# Patient Record
Sex: FEMALE | Race: WHITE | NOT HISPANIC OR LATINO | Employment: FULL TIME | URBAN - NONMETROPOLITAN AREA
[De-identification: names, ages, dates, MRNs, and addresses within clinical notes are randomized per-mention and may not be internally consistent; named-entity substitution may affect disease eponyms.]

---

## 2017-07-27 ENCOUNTER — APPOINTMENT (OUTPATIENT)
Dept: OCCUPATIONAL MEDICINE | Facility: OTHER | Age: 32
End: 2017-07-27

## 2017-08-01 ENCOUNTER — APPOINTMENT (OUTPATIENT)
Dept: OCCUPATIONAL MEDICINE | Facility: OTHER | Age: 32
End: 2017-08-01

## 2017-10-31 ENCOUNTER — APPOINTMENT (OUTPATIENT)
Dept: CT IMAGING | Facility: HOSPITAL | Age: 32
End: 2017-10-31
Attending: INTERNAL MEDICINE
Payer: COMMERCIAL

## 2017-10-31 ENCOUNTER — APPOINTMENT (OUTPATIENT)
Dept: GENERAL RADIOLOGY | Facility: HOSPITAL | Age: 32
End: 2017-10-31
Attending: INTERNAL MEDICINE
Payer: COMMERCIAL

## 2017-10-31 ENCOUNTER — HOSPITAL ENCOUNTER (EMERGENCY)
Facility: HOSPITAL | Age: 32
Discharge: HOME | End: 2017-10-31
Attending: INTERNAL MEDICINE | Admitting: INTERNAL MEDICINE
Payer: COMMERCIAL

## 2017-10-31 VITALS
SYSTOLIC BLOOD PRESSURE: 123 MMHG | DIASTOLIC BLOOD PRESSURE: 80 MMHG | TEMPERATURE: 98.6 F | HEART RATE: 107 BPM | OXYGEN SATURATION: 100 % | RESPIRATION RATE: 18 BRPM

## 2017-10-31 DIAGNOSIS — R55 VASOVAGAL SYNCOPE: ICD-10-CM

## 2017-10-31 LAB
ALBUMIN SERPL-MCNC: 4.1 G/DL (ref 3.4–5)
ALBUMIN UR-MCNC: 30 MG/DL
ALP SERPL-CCNC: 58 U/L (ref 40–150)
ALT SERPL W P-5'-P-CCNC: 31 U/L (ref 0–50)
ANION GAP SERPL CALCULATED.3IONS-SCNC: 6 MMOL/L (ref 3–14)
APPEARANCE UR: ABNORMAL
AST SERPL W P-5'-P-CCNC: 15 U/L (ref 0–45)
BACTERIA #/AREA URNS HPF: ABNORMAL /HPF
BASOPHILS # BLD AUTO: 0 10E9/L (ref 0–0.2)
BASOPHILS NFR BLD AUTO: 0.4 %
BILIRUB SERPL-MCNC: 0.6 MG/DL (ref 0.2–1.3)
BILIRUB UR QL STRIP: NEGATIVE
BUN SERPL-MCNC: 9 MG/DL (ref 7–30)
CALCIUM SERPL-MCNC: 8.6 MG/DL (ref 8.5–10.1)
CHLORIDE SERPL-SCNC: 107 MMOL/L (ref 94–109)
CK SERPL-CCNC: 32 U/L (ref 30–225)
CO2 SERPL-SCNC: 25 MMOL/L (ref 20–32)
COLOR UR AUTO: YELLOW
CREAT SERPL-MCNC: 0.74 MG/DL (ref 0.52–1.04)
CRP SERPL-MCNC: <2.9 MG/L (ref 0–8)
D DIMER PPP DDU-MCNC: <200 NG/ML D-DU (ref 0–300)
DIFFERENTIAL METHOD BLD: ABNORMAL
EOSINOPHIL # BLD AUTO: 0 10E9/L (ref 0–0.7)
EOSINOPHIL NFR BLD AUTO: 0.3 %
ERYTHROCYTE [DISTWIDTH] IN BLOOD BY AUTOMATED COUNT: 13.2 % (ref 10–15)
GFR SERPL CREATININE-BSD FRML MDRD: >90 ML/MIN/1.7M2
GLUCOSE SERPL-MCNC: 92 MG/DL (ref 70–99)
GLUCOSE UR STRIP-MCNC: NEGATIVE MG/DL
HCG UR QL: NEGATIVE
HCT VFR BLD AUTO: 42.2 % (ref 35–47)
HGB BLD-MCNC: 14.1 G/DL (ref 11.7–15.7)
HGB UR QL STRIP: NEGATIVE
IMM GRANULOCYTES # BLD: 0 10E9/L (ref 0–0.4)
IMM GRANULOCYTES NFR BLD: 0.3 %
KETONES UR STRIP-MCNC: NEGATIVE MG/DL
LACTATE SERPL-SCNC: 0.8 MMOL/L (ref 0.4–2)
LEUKOCYTE ESTERASE UR QL STRIP: ABNORMAL
LYMPHOCYTES # BLD AUTO: 1.4 10E9/L (ref 0.8–5.3)
LYMPHOCYTES NFR BLD AUTO: 12 %
MCH RBC QN AUTO: 28.1 PG (ref 26.5–33)
MCHC RBC AUTO-ENTMCNC: 33.4 G/DL (ref 31.5–36.5)
MCV RBC AUTO: 84 FL (ref 78–100)
MONOCYTES # BLD AUTO: 0.6 10E9/L (ref 0–1.3)
MONOCYTES NFR BLD AUTO: 4.9 %
MUCOUS THREADS #/AREA URNS LPF: PRESENT /LPF
NEUTROPHILS # BLD AUTO: 9.4 10E9/L (ref 1.6–8.3)
NEUTROPHILS NFR BLD AUTO: 82.1 %
NITRATE UR QL: NEGATIVE
NRBC # BLD AUTO: 0 10*3/UL
NRBC BLD AUTO-RTO: 0 /100
PH UR STRIP: 6 PH (ref 4.7–8)
PLATELET # BLD AUTO: 249 10E9/L (ref 150–450)
POTASSIUM SERPL-SCNC: 3.6 MMOL/L (ref 3.4–5.3)
PROT SERPL-MCNC: 8 G/DL (ref 6.8–8.8)
RBC # BLD AUTO: 5.02 10E12/L (ref 3.8–5.2)
RBC #/AREA URNS AUTO: 2 /HPF (ref 0–2)
SODIUM SERPL-SCNC: 138 MMOL/L (ref 133–144)
SOURCE: ABNORMAL
SP GR UR STRIP: 1.02 (ref 1–1.03)
SQUAMOUS #/AREA URNS AUTO: 7 /HPF (ref 0–1)
TROPONIN I SERPL-MCNC: <0.015 UG/L (ref 0–0.04)
UROBILINOGEN UR STRIP-MCNC: 2 MG/DL (ref 0–2)
WBC # BLD AUTO: 11.4 10E9/L (ref 4–11)
WBC #/AREA URNS AUTO: 11 /HPF (ref 0–2)

## 2017-10-31 PROCEDURE — 93010 ELECTROCARDIOGRAM REPORT: CPT | Performed by: INTERNAL MEDICINE

## 2017-10-31 PROCEDURE — 99285 EMERGENCY DEPT VISIT HI MDM: CPT | Performed by: FAMILY MEDICINE

## 2017-10-31 PROCEDURE — 36415 COLL VENOUS BLD VENIPUNCTURE: CPT | Performed by: INTERNAL MEDICINE

## 2017-10-31 PROCEDURE — 87086 URINE CULTURE/COLONY COUNT: CPT | Performed by: INTERNAL MEDICINE

## 2017-10-31 PROCEDURE — 84484 ASSAY OF TROPONIN QUANT: CPT | Performed by: INTERNAL MEDICINE

## 2017-10-31 PROCEDURE — 81001 URINALYSIS AUTO W/SCOPE: CPT | Performed by: INTERNAL MEDICINE

## 2017-10-31 PROCEDURE — 96360 HYDRATION IV INFUSION INIT: CPT

## 2017-10-31 PROCEDURE — 85379 FIBRIN DEGRADATION QUANT: CPT | Performed by: INTERNAL MEDICINE

## 2017-10-31 PROCEDURE — 25000128 H RX IP 250 OP 636: Performed by: INTERNAL MEDICINE

## 2017-10-31 PROCEDURE — 93005 ELECTROCARDIOGRAM TRACING: CPT

## 2017-10-31 PROCEDURE — 82550 ASSAY OF CK (CPK): CPT | Performed by: INTERNAL MEDICINE

## 2017-10-31 PROCEDURE — 99285 EMERGENCY DEPT VISIT HI MDM: CPT | Mod: 25

## 2017-10-31 PROCEDURE — 85025 COMPLETE CBC W/AUTO DIFF WBC: CPT | Performed by: INTERNAL MEDICINE

## 2017-10-31 PROCEDURE — 83605 ASSAY OF LACTIC ACID: CPT | Performed by: INTERNAL MEDICINE

## 2017-10-31 PROCEDURE — 71020 XR CHEST 2 VW: CPT | Mod: TC

## 2017-10-31 PROCEDURE — 81025 URINE PREGNANCY TEST: CPT | Performed by: INTERNAL MEDICINE

## 2017-10-31 PROCEDURE — 86140 C-REACTIVE PROTEIN: CPT | Performed by: INTERNAL MEDICINE

## 2017-10-31 PROCEDURE — 80053 COMPREHEN METABOLIC PANEL: CPT | Performed by: INTERNAL MEDICINE

## 2017-10-31 PROCEDURE — 70450 CT HEAD/BRAIN W/O DYE: CPT | Mod: TC

## 2017-10-31 RX ORDER — MULTIPLE VITAMINS W/ MINERALS TAB 9MG-400MCG
1 TAB ORAL DAILY
COMMUNITY

## 2017-10-31 RX ADMIN — SODIUM CHLORIDE 1000 ML: 9 INJECTION, SOLUTION INTRAVENOUS at 07:42

## 2017-10-31 ASSESSMENT — ENCOUNTER SYMPTOMS
COLOR CHANGE: 0
BACK PAIN: 0
NUMBNESS: 0
CHEST TIGHTNESS: 0
WOUND: 0
ABDOMINAL PAIN: 0
WHEEZING: 0
HEMATURIA: 0
WEAKNESS: 0
HEADACHES: 0
FLANK PAIN: 0
DIAPHORESIS: 0
DYSURIA: 0
DIZZINESS: 1
PALPITATIONS: 0
MYALGIAS: 0
FEVER: 0
SEIZURES: 0
SHORTNESS OF BREATH: 0
CHILLS: 0
COUGH: 0
ANAL BLEEDING: 0
NECK PAIN: 0
SYNCOPE: 1
NECK STIFFNESS: 0
ABDOMINAL DISTENTION: 0
BLOOD IN STOOL: 0
NAUSEA: 0
CONFUSION: 0
VOMITING: 0
SPEECH DIFFICULTY: 0
VOICE CHANGE: 0
LIGHT-HEADEDNESS: 1
ARTHRALGIAS: 0

## 2017-10-31 NOTE — ED NOTES
Arrived to triage with  at side with c/o passing out while having a bowel movement on the toilet at 0615 this morning.  states patient was unconscious for about 30 seconds. Denies hitting head. Denies any pain. States she has passed out like this before. Denies taking any medications other than multi-vitamin. Denies any dizziness or issues now but does c/o slight nausea.

## 2017-10-31 NOTE — ED AVS SNAPSHOT
[facility]    [address] [address]    Phone:  [phone]                                       Bing Osullivan   MRN: [id number]    Department:  [facility]   Date of Visit:  10/31/2017           Patient Information     Date Of Birth          1985        Your diagnoses for this visit were:     Vasovagal syncope        You were seen by Morgan Rushing MD.      Follow-up Information     Follow up with Anthony Drake DO In 1 week.    Specialty:  Family Practice    Why:  Follow up ED visit    Contact information:    [facility]  [address] [facility] [address]  [phone]        Discharge References/Attachments     VASOVAGAL SYNCOPE, UNDERSTANDING (ENGLISH)         Review of your medicines      Our records show that you are taking the medicines listed below. If these are incorrect, please call your family doctor or clinic.        Dose / Directions Last dose taken    Multi-vitamin Tabs tablet   Dose:  1 tablet        Take 1 tablet by mouth daily   Refills:  0                Procedures and tests performed during your visit     CBC with platelets differential    CK total    CRP inflammation    CT Head w/o Contrast    Cardiac Continuous Monitoring    Comprehensive metabolic panel    D-Dimer ([facility],)    EKG 12-lead, tracing only    HCG qualitative urine    Lactic acid    Orthostatic blood pressure and pulse    Troponin I    UA with Microscopic reflex to Culture    Urine Culture Aerobic Bacterial    XR Chest 2 Views      Orders Needing Specimen Collection     None      Pending Results     Date and Time Order Name Status Description    10/31/2017 0838 Urine Culture Aerobic Bacterial In process             Pending Culture Results     Date and Time Order Name Status Description    10/31/2017 0838 Urine Culture Aerobic Bacterial In process             Thank you for choosing [facility]       Thank you for choosing [facility] for your care. Our goal is always  to provide you with excellent care. Hearing back from our patients is one way we can continue to improve our services. Please take a few minutes to complete the written survey that you may receive in the mail after you visit with us. Thank you!        [employer] Information     [employer] gives you secure access to your electronic health record. If you see a primary care provider, you can also send messages to your care team and make appointments. If you have questions, please call your primary care clinic.  If you do not have a primary care provider, please call [phone] and they will assist you.        Care EveryWhere ID     This is your Care EveryWhere ID. This could be used by other organizations to access your [place] medical records  [id number]        Equal Access to Services     DUNCAN WILKERSON : Thania Darnell, harini anguiano, robbi garcia, barbara campbell. So [facility] [phone].    ATENCIÓN: Si habla español, tiene a gaffney disposición servicios gratuitos de asistencia lingüística. Llame al [phone].    We comply with applicable federal civil rights laws and [place] laws. We do not discriminate on the basis of race, color, national origin, age, disability, sex, sexual orientation, or gender identity.            After Visit Summary       This is your record. Keep this with you and show to your community pharmacist(s) and doctor(s) at your next visit.

## 2017-10-31 NOTE — ED PROVIDER NOTES
History     Chief Complaint   Patient presents with     Loss of Consciousness     states was having a bowel movement on the toilet at 0615 when she passed out.     Patient is a 32 year old female presenting with syncope. The history is provided by the patient.   Syncope   Episode history:  Single  Most recent episode:  Today  Duration:  20 seconds  Timing:  Sporadic  Progression:  Resolved  Chronicity:  Recurrent  Context: bowel movement    Associated symptoms: dizziness    Associated symptoms: no chest pain, no confusion, no diaphoresis, no fever, no headaches, no nausea, no palpitations, no seizures, no shortness of breath, no vomiting and no weakness          Problem List:    Patient Active Problem List    Diagnosis Date Noted     Preeclampsia complicating hypertension 2016     Priority: Medium     Post partum          delivery delivered 2016     Priority: Medium     Twins.  Breech  1 LTCS with BTO  Jaiden 16       Encounter for sterilization 2016     Priority: Medium     Desires BTO if CS.  Counseled.  Preferred one insurance.  No gov't pay.         HTN (hypertension) 01/15/2015     Priority: Medium     Palpitations 2014     Priority: Medium     Work up benign 4 years ago with echo/ekg by report.  Was placed on B-Blocker.  Will fu with Dr. Rodrigues.          Past Medical History:    Past Medical History:   Diagnosis Date     Seasonal allergies        Past Surgical History:    Past Surgical History:   Procedure Laterality Date     COMBINED  SECTION, TUBAL OCCLUSION Bilateral 2016    Procedure: COMBINED  SECTION, TUBAL OCCLUSION;  Surgeon: Elder Hwang MD;  Location: [facility]     wisdom teeth removed         Family History:    Family History   Problem Relation Age of Onset     Hypertension Mother      Lipids Father      Hyperlipidemia Father      DIABETES No family hx of      Asthma No family hx of      Thyroid Disease No family hx of      Colon Cancer No  family hx of      Breast Cancer No family hx of      MYH-Associated Polyposis Maternal Uncle        Social History:  Marital Status:   [2]  Social History   Substance Use Topics     Smoking status: Never Smoker     Smokeless tobacco: Never Used     Alcohol use No      Comment: Occasionally         Medications:      multivitamin, therapeutic with minerals (MULTI-VITAMIN) TABS tablet         Review of Systems   Constitutional: Negative for chills, diaphoresis and fever.   HENT: Negative for voice change.    Eyes: Negative for visual disturbance.   Respiratory: Negative for cough, chest tightness, shortness of breath and wheezing.    Cardiovascular: Positive for syncope. Negative for chest pain, palpitations and leg swelling.   Gastrointestinal: Negative for abdominal distention, abdominal pain, anal bleeding, blood in stool, nausea and vomiting.   Genitourinary: Negative for decreased urine volume, dysuria, flank pain and hematuria.   Musculoskeletal: Negative for arthralgias, back pain, gait problem, myalgias, neck pain and neck stiffness.   Skin: Negative for color change, pallor, rash and wound.   Neurological: Positive for dizziness, syncope and light-headedness. Negative for seizures, speech difficulty, weakness, numbness and headaches.   Psychiatric/Behavioral: Negative for confusion and suicidal ideas.       Physical Exam   BP: 117/92  Pulse: 107  Heart Rate: 95  Temp: 97  F (36.1  C)  Resp: 17  SpO2: 100 %  Lying Orthostatic BP: 115/74  Lying Orthostatic Pulse: 97 bpm  Sitting Orthostatic BP: 117/89  Sitting Orthostatic Pulse: 104 bpm  Standing Orthostatic BP: 112/82  Standing Orthostatic Pulse: 115 bpm      Physical Exam   Constitutional: She is oriented to person, place, and time. She appears well-developed and well-nourished.   HENT:   Head: Normocephalic and atraumatic.   Mouth/Throat: No oropharyngeal exudate.   Eyes: Conjunctivae are normal. Pupils are equal, round, and reactive to light.   Neck:  Normal range of motion. Neck supple. No JVD present. No tracheal deviation present. No thyromegaly present.   Cardiovascular: Normal rate, regular rhythm, normal heart sounds and intact distal pulses.  Exam reveals no gallop and no friction rub.    No murmur heard.  Pulmonary/Chest: Effort normal and breath sounds normal. No stridor. No respiratory distress. She has no wheezes. She has no rales. She exhibits no tenderness.   Abdominal: Soft. Bowel sounds are normal. She exhibits no distension and no mass. There is no tenderness. There is no rebound and no guarding.   Musculoskeletal: Normal range of motion. She exhibits no edema or tenderness.   Lymphadenopathy:     She has no cervical adenopathy.   Neurological: She is alert and oriented to person, place, and time.   Skin: Skin is warm and dry. No rash noted. No erythema. No pallor.   Psychiatric: Her behavior is normal.   Nursing note and vitals reviewed.      ED Course     ED Course     Procedures    Labs Ordered and Resulted from Time of ED Arrival Up to the Time of Departure from the ED   CBC WITH PLATELETS DIFFERENTIAL - Abnormal; Notable for the following:        Result Value    WBC 11.4 (*)     Absolute Neutrophil 9.4 (*)     All other components within normal limits   ROUTINE UA WITH MICROSCOPIC REFLEX TO CULTURE - Abnormal; Notable for the following:     Protein Albumin Urine 30 (*)     Leukocyte Esterase Urine Moderate (*)     WBC Urine 11 (*)     Bacteria Urine Few (*)     Squamous Epithelial /HPF Urine 7 (*)     Mucous Urine Present (*)     All other components within normal limits   LACTIC ACID   CRP INFLAMMATION   TROPONIN I   COMPREHENSIVE METABOLIC PANEL   CK TOTAL   D-DIMER ([facility])   HCG QUALITATIVE URINE   CARDIAC CONTINUOUS MONITORING   ORTHOSTATIC BLOOD PRESSURE AND PULSE   URINE CULTURE AEROBIC BACTERIAL       Assessments & Plan (with Medical Decision Making)   Had 1 episode of watery diarrhea, while in bathroom lost consciousness for less than  30 sec, witnessed by . Before this she felt lightheaded and dizzy.     Symptoms resolved completely , has had hx of similar episode in the past.     Labs and imaging studies ordered, s/o to Dr Magallanes at the change of shift.     CT head is normal as are labs.  Patient will be discharged with follow up with PCP in 1 week.    I have reviewed the nursing notes.    I have reviewed the findings, diagnosis, plan and need for follow up with the patient.    New Prescriptions    No medications on file       Final diagnoses:   Vasovagal syncope       10/31/2017   [facility]     Odalys Powell MD  10/31/17 0905

## 2017-10-31 NOTE — ED AVS SNAPSHOT
[facility]    [address] [address]    Phone:  [phone]                                       Bing Osullivan   MRN: [id number]    Department:  [facility]   Date of Visit:  10/31/2017           After Visit Summary Signature Page     I have received my discharge instructions, and my questions have been answered. I have discussed any challenges I see with this plan with the nurse or doctor.    ..........................................................................................................................................  Patient/Patient Representative Signature      ..........................................................................................................................................  Patient Representative Print Name and Relationship to Patient    ..................................................               ................................................  Date                                            Time    ..........................................................................................................................................  Reviewed by Signature/Title    ...................................................              ..............................................  Date                                                            Time

## 2017-10-31 NOTE — ED NOTES
Patient discharged home at this time. Patient given written and verbal discharge instructions regarding home care, f/u and medications. Patient verbalized understanding of all discharge instructions. Given work release notice

## 2017-10-31 NOTE — LETTER
[facility]  [address]  [place] [address]  Phone: [phone]    October 31, 2017        Bing Osullivan  [address]  [place] [address]          To whom it may concern:    RE: Bing Osullivan    Bing Osullivan was seen in the emergency room and should not return to work today.    Please contact me for questions or concerns.      Sincerely,        Odalys Iyer MD

## 2017-11-01 LAB
BACTERIA SPEC CULT: ABNORMAL
SPECIMEN SOURCE: ABNORMAL

## 2017-11-26 ENCOUNTER — HEALTH MAINTENANCE LETTER (OUTPATIENT)
Age: 32
End: 2017-11-26

## 2018-08-13 ENCOUNTER — OFFICE VISIT (OUTPATIENT)
Dept: CHIROPRACTIC MEDICINE | Facility: OTHER | Age: 33
End: 2018-08-13
Attending: CHIROPRACTOR
Payer: COMMERCIAL

## 2018-08-13 DIAGNOSIS — M54.2 CERVICALGIA: ICD-10-CM

## 2018-08-13 DIAGNOSIS — M99.02 SEGMENTAL AND SOMATIC DYSFUNCTION OF THORACIC REGION: ICD-10-CM

## 2018-08-13 DIAGNOSIS — M99.01 SEGMENTAL AND SOMATIC DYSFUNCTION OF CERVICAL REGION: Primary | ICD-10-CM

## 2018-08-13 PROCEDURE — 98940 CHIROPRACT MANJ 1-2 REGIONS: CPT | Mod: AT | Performed by: CHIROPRACTOR

## 2018-08-13 PROCEDURE — 99202 OFFICE O/P NEW SF 15 MIN: CPT | Mod: 25 | Performed by: CHIROPRACTOR

## 2018-08-13 NOTE — MR AVS SNAPSHOT
After Visit Summary   8/13/2018    Bing Osullivan    MRN: [id number]           Patient Information     Date Of Birth          1985        Visit Information        Provider Department      8/13/2018 10:30 AM [facility] J Valdiv[facility]        Today's Diagnoses     Segmental and somatic dysfunction of cervical region    -  1    Cervicalgia        Segmental and somatic dysfunction of thoracic region           Follow-ups after your visit        Your next 10 appointments already scheduled     Aug 16, 2018  8:30 AM CDT   Return Visit with Francisco J Valdivia DC   [facility] Natasha Garcia ([facility])    [address]  [facility] [address]   [phone]              Who to contact     If you have questions or need follow up information about today's clinic visit or your schedule please contact  [facility] NATASHA GARCIA directly at [phone].  Normal or non-critical lab and imaging results will be communicated to you by Tripsideahart, letter or phone within 4 business days after the clinic has received the results. If you do not hear from us within 7 days, please contact the clinic through [employer]ps[employer] or phone. If you have a critical or abnormal lab result, we will notify you by phone as soon as possible.  Submit refill requests through virocyt or call your pharmacy and they will forward the refill request to us. Please allow 3 business days for your refill to be completed.          Additional Information About Your Visit        [employer] Information     [employer] gives you secure access to your electronic health record. If you see a primary care provider, you can also send messages to your care team and make appointments. If you have questions, please call your primary care clinic.  If you do not have a primary care provider, please call [phone] and they will assist you.        Care EveryWhere ID     This is your Care EveryWhere ID. This could be used by other organizations to access your  [place] medical records  [id number]         Blood Pressure from Last 3 Encounters:   10/31/17 123/80   08/02/16 128/74   07/19/16 112/64    Weight from Last 3 Encounters:   08/02/16 171 lb (77.6 kg)   07/19/16 168 lb (76.2 kg)   06/28/16 171 lb (77.6 kg)              We Performed the Following     CHIROPRAC MANIP,SPINAL,1-2 REGIONS        Primary Care Provider Office Phone # Fax #    Anthony Drake  [phone]       [facility] [address]  [facility] [address]        Equal Access to Services     [facility] [place] : Hadii leslie Darnell, waaxda luqadaha, qaybta kaalmada jose, barbara lang . So [facility] [phone].    ATENCIÓN: Si habla español, tiene a gaffney disposición servicios gratuitos de asistencia lingüística. Llame al [phone].    We comply with applicable federal civil rights laws and [place] laws. We do not discriminate on the basis of race, color, national origin, age, disability, sex, sexual orientation, or gender identity.            Thank you!     Thank you for choosing  [facility]  for your care. Our goal is always to provide you with excellent care. Hearing back from our patients is one way we can continue to improve our services. Please take a few minutes to complete the written survey that you may receive in the mail after your visit with us. Thank you!             Your Updated Medication List - Protect others around you: Learn how to safely use, store and throw away your medicines at www.disposemymeds.org.          This list is accurate as of 8/13/18  2:38 PM.  Always use your most recent med list.                   Brand Name Dispense Instructions for use Diagnosis    Multi-vitamin Tabs tablet      Take 1 tablet by mouth daily

## 2018-08-13 NOTE — PROGRESS NOTES
Subjective Finding:    Chief compalint: Patient presents with:  Neck Pain  Back Pain: upper back pan  , Pain Scale: 6/10, Intensity: sharp, Duration: 2 years, Radiating: no.    Date of injury:     Activities that the pain restricts:   Home/household/hobbies/social activities: yes.  Work duties: yes.  Sleep: yes.  Makes symptoms better: rest.  Makes symptoms worse: activity, cervical extension and cervical flexion.  Have you seen anyone else for the symptoms? Yes: MD.  Work related: no.  Automobile related injury: no.    Objective and Assessment:    Posture Analysis:   High shoulder: .  Head tilt: .  High iliac crest: .  Head carriage: forward.  Thoracic Kyphosis: neutral.  Lumbar Lordosis: neutral.    Lumbar Range of Motion: .  Cervical Range of Motion: extension decreased.  Thoracic Range of Motion: extension decreased.  Extremity Range of Motion: .    Palpation:   Lev scapulae: sharp pain, referred pain: no    Segmental dysfunction pre-treatment and treatment area: C2, C5, C7 and T7.    Assessment post-treatment:  Cervical: ROM increased.  Thoracic: ROM increased.  Lumbar: .    Comments: .      Complicating Factors: .    Procedure(s):  CMT:  72761 Chiropractic manipulative treatment 1-2 regions performed   Cervical: Diversified, See above for level, Supine and Thoracic: Diversified, See above for level, Prone    Modalities:  None performed this visit    Therapeutic procedures:  None    Plan:  Treatment plan: PRN.  Instructed patient: stretch as instructed at visit.  Short term goals: increase ROM.  Long term goals: restore normal function.  Prognosis: excellent.

## 2018-08-31 ENCOUNTER — OFFICE VISIT (OUTPATIENT)
Dept: CHIROPRACTIC MEDICINE | Facility: OTHER | Age: 33
End: 2018-08-31
Attending: CHIROPRACTOR
Payer: COMMERCIAL

## 2018-08-31 DIAGNOSIS — M99.01 SEGMENTAL AND SOMATIC DYSFUNCTION OF CERVICAL REGION: Primary | ICD-10-CM

## 2018-08-31 DIAGNOSIS — M99.02 SEGMENTAL AND SOMATIC DYSFUNCTION OF THORACIC REGION: ICD-10-CM

## 2018-08-31 DIAGNOSIS — M54.2 CERVICALGIA: ICD-10-CM

## 2018-08-31 PROCEDURE — 98940 CHIROPRACT MANJ 1-2 REGIONS: CPT | Mod: AT | Performed by: CHIROPRACTOR

## 2018-08-31 NOTE — MR AVS SNAPSHOT
After Visit Summary   8/31/2018    Bing Osullivan    MRN: [id number]           Patient Information     Date Of Birth          1985        Visit Information        Provider Department      8/31/2018 8:30 AM Francicso J [facility]        Today's Diagnoses     Segmental and somatic dysfunction of cervical region    -  1    Cervicalgia        Segmental and somatic dysfunction of thoracic region           Follow-ups after your visit        Who to contact     If you have questions or need follow up information about today's clinic visit or your schedule please contact  [facility] [place] GARCIA directly at [phone].  Normal or non-critical lab and imaging results will be communicated to you by Heliatekhart, letter or phone within 4 business days after the clinic has received the results. If you do not hear from us within 7 days, please contact the clinic through [employer] or phone. If you have a critical or abnormal lab result, we will notify you by phone as soon as possible.  Submit refill requests through [employer] or call your pharmacy and they will forward the refill request to us. Please allow 3 business days for your refill to be completed.          Additional Information About Your Visit        MyChart Information     [employer] gives you secure access to your electronic health record. If you see a primary care provider, you can also send messages to your care team and make appointments. If you have questions, please call your primary care clinic.  If you do not have a primary care provider, please call [phone] and they will assist you.        Care EveryWhere ID     This is your Care EveryWhere ID. This could be used by other organizations to access your [place] medical records  [id number]         Blood Pressure from Last 3 Encounters:   10/31/17 123/80   08/02/16 128/74   07/19/16 112/64    Weight from Last 3 Encounters:   08/02/16 171 lb (77.6 kg)   07/19/16 168 lb (76.2 kg)    06/28/16 171 lb (77.6 kg)              We Performed the Following     CHIROPRAC MANIP,SPINAL,1-2 REGIONS        Primary Care Provider Office Phone # Fax #    Anthony Drake [phone]       [facility] [address]  [facility] [address]        Equal Access to Services     DUNCAN WILKERSON : Hadii aad ku hadasho Soomaali, waaxda luqadaha, qaybta kaalmada adeegyada, waxay idiin hayaan adesindy ruiztonyaemily lang . So [facility] [phone].    ATENCIÓN: Si habla español, tiene a gaffney disposición servicios gratuitos de asistencia lingüística. Tawanda al [phone].    We comply with applicable federal civil rights laws and [place] laws. We do not discriminate on the basis of race, color, national origin, age, disability, sex, sexual orientation, or gender identity.            Thank you!     Thank you for choosing  [facility]  for your care. Our goal is always to provide you with excellent care. Hearing back from our patients is one way we can continue to improve our services. Please take a few minutes to complete the written survey that you may receive in the mail after your visit with us. Thank you!             Your Updated Medication List - Protect others around you: Learn how to safely use, store and throw away your medicines at www.disposemymeds.org.          This list is accurate as of 8/31/18 11:59 PM.  Always use your most recent med list.                   Brand Name Dispense Instructions for use Diagnosis    Multi-vitamin Tabs tablet      Take 1 tablet by mouth daily

## 2018-09-04 NOTE — PROGRESS NOTES
Subjective Finding:    Chief compalint: Patient presents with:  Neck Pain: getting better  Back Pain  , Pain Scale: 6/10, Intensity: sharp, Duration: 2 years, Radiating: no.    Date of injury:     Activities that the pain restricts:   Home/household/hobbies/social activities: yes.  Work duties: yes.  Sleep: yes.  Makes symptoms better: rest.  Makes symptoms worse: activity, cervical extension and cervical flexion.  Have you seen anyone else for the symptoms? Yes: MD.  Work related: no.  Automobile related injury: no.    Objective and Assessment:    Posture Analysis:   High shoulder: .  Head tilt: .  High iliac crest: .  Head carriage: forward.  Thoracic Kyphosis: neutral.  Lumbar Lordosis: neutral.    Lumbar Range of Motion: .  Cervical Range of Motion: extension decreased.  Thoracic Range of Motion: extension decreased.  Extremity Range of Motion: .    Palpation:   Lev scapulae: sharp pain, referred pain: no    Segmental dysfunction pre-treatment and treatment area: C2, C5, C7 and T7.    Assessment post-treatment:  Cervical: ROM increased.  Thoracic: ROM increased.  Lumbar: .    Comments: .      Complicating Factors: .    Procedure(s):  CMT:  88424 Chiropractic manipulative treatment 1-2 regions performed   Cervical: Diversified, See above for level, Supine and Thoracic: Diversified, See above for level, Prone    Modalities:  None performed this visit    Therapeutic procedures:  None    Plan:  Treatment plan: PRN.  Instructed patient: stretch as instructed at visit.  Short term goals: increase ROM.  Long term goals: restore normal function.  Prognosis: excellent.

## 2020-03-02 ENCOUNTER — HEALTH MAINTENANCE LETTER (OUTPATIENT)
Age: 35
End: 2020-03-02

## 2020-10-29 ENCOUNTER — MEDICAL CORRESPONDENCE (OUTPATIENT)
Dept: MRI IMAGING | Facility: HOSPITAL | Age: 35
End: 2020-10-29

## 2020-11-05 ENCOUNTER — HOSPITAL ENCOUNTER (OUTPATIENT)
Dept: MRI IMAGING | Facility: HOSPITAL | Age: 35
Discharge: HOME | End: 2020-11-05
Attending: FAMILY MEDICINE | Admitting: FAMILY MEDICINE
Payer: COMMERCIAL

## 2020-11-05 DIAGNOSIS — M79.89 SOFT TISSUE MASS: ICD-10-CM

## 2020-11-05 DIAGNOSIS — M79.605 PAIN IN LEFT LEG: ICD-10-CM

## 2020-11-05 DIAGNOSIS — M79.605 LEFT LEG PAIN: ICD-10-CM

## 2020-11-05 PROCEDURE — A9585 GADOBUTROL INJECTION: HCPCS | Performed by: RADIOLOGY

## 2020-11-05 PROCEDURE — 73720 MRI LWR EXTREMITY W/O&W/DYE: CPT | Mod: LT

## 2020-11-05 PROCEDURE — 255N000002 HC RX 255 OP 636: Performed by: RADIOLOGY

## 2020-11-05 RX ORDER — GADOBUTROL 604.72 MG/ML
7.5 INJECTION INTRAVENOUS ONCE
Status: COMPLETED | OUTPATIENT
Start: 2020-11-05 | End: 2020-11-05

## 2020-11-05 RX ADMIN — GADOBUTROL 7.5 ML: 604.72 INJECTION INTRAVENOUS at 17:21

## 2020-12-20 ENCOUNTER — HEALTH MAINTENANCE LETTER (OUTPATIENT)
Age: 35
End: 2020-12-20

## 2021-04-18 ENCOUNTER — HEALTH MAINTENANCE LETTER (OUTPATIENT)
Age: 36
End: 2021-04-18

## 2021-06-06 ENCOUNTER — HOSPITAL ENCOUNTER (EMERGENCY)
Facility: HOSPITAL | Age: 36
Discharge: HOME | End: 2021-06-06
Attending: NURSE PRACTITIONER | Admitting: NURSE PRACTITIONER
Payer: COMMERCIAL

## 2021-06-06 ENCOUNTER — APPOINTMENT (OUTPATIENT)
Dept: GENERAL RADIOLOGY | Facility: HOSPITAL | Age: 36
End: 2021-06-06
Attending: NURSE PRACTITIONER
Payer: COMMERCIAL

## 2021-06-06 VITALS
HEART RATE: 93 BPM | SYSTOLIC BLOOD PRESSURE: 153 MMHG | OXYGEN SATURATION: 100 % | RESPIRATION RATE: 14 BRPM | TEMPERATURE: 98.1 F | DIASTOLIC BLOOD PRESSURE: 86 MMHG

## 2021-06-06 DIAGNOSIS — S43.401A SPRAIN OF RIGHT SHOULDER, UNSPECIFIED SHOULDER SPRAIN TYPE, INITIAL ENCOUNTER: ICD-10-CM

## 2021-06-06 PROCEDURE — 73030 X-RAY EXAM OF SHOULDER: CPT | Mod: RT

## 2021-06-06 PROCEDURE — G0463 HOSPITAL OUTPT CLINIC VISIT: HCPCS

## 2021-06-06 PROCEDURE — 99213 OFFICE O/P EST LOW 20 MIN: CPT | Performed by: NURSE PRACTITIONER

## 2021-06-06 RX ORDER — HYDROCHLOROTHIAZIDE 12.5 MG/1
12.5 TABLET ORAL DAILY
COMMUNITY

## 2021-06-06 RX ORDER — LOSARTAN POTASSIUM 100 MG/1
100 TABLET ORAL DAILY
COMMUNITY

## 2021-06-06 ASSESSMENT — ENCOUNTER SYMPTOMS
NUMBNESS: 0
CHILLS: 0
NECK PAIN: 0
COLOR CHANGE: 0
SHORTNESS OF BREATH: 0
ACTIVITY CHANGE: 1
NECK STIFFNESS: 0
NAUSEA: 0
FEVER: 0
VOMITING: 0

## 2021-06-06 NOTE — ED PROVIDER NOTES
History     Chief Complaint   Patient presents with     Shoulder Pain     rt shoulder pain due to injury today     HPI  Bing Osullivan is a 36 year old female who presents with right shoulder pain that occurred today after she fell backwards onto the steps.  She heard a pop.  Had some numbness and tingling in her fingers that has resolved at this time.  Her elbow is nontender.  Denies previous shoulder injuries.  She is right-hand dominant.  Has not taken any over-the-counter medications or tried any home interventions.  Denies any neck pain.  Denies fevers, chills, nausea, vomiting, and shortness of breath.    Musculoskeletal problem/pain      Duration: today    Description  Location: right shoulder right hand dominant    Intensity:  8/10 with movement    Accompanying signs and symptoms: numbness and tingling in fingers has resolved. Elbow is ok    History  Previous similar problem: no   Previous evaluation:  none    Precipitating or alleviating factors:  Trauma or overuse: YES- fell backwards onto stairs  Aggravating factors include: moving    Therapies tried and outcome: nothing     Allergies:  Allergies   Allergen Reactions     Seasonal Allergies Other (See Comments)     Sinus       Dust Mites Other (See Comments)     Red eyes and sneezing       Problem List:    Patient Active Problem List    Diagnosis Date Noted     Preeclampsia complicating hypertension 2016     Priority: Medium     Post partum          delivery delivered 2016     Priority: Medium     Twins.  Breech  1 LTCS with BTO  Hwang 15       Encounter for sterilization 2016     Priority: Medium     Desires BTO if CS.  Counseled.  Preferred one insurance.  No gov't pay.         HTN (hypertension) 01/15/2015     Priority: Medium     Palpitations 2014     Priority: Medium     Work up benign 4 years ago with echo/ekg by report.  Was placed on B-Blocker.  Will fu with Dr. Rodrigues.          Past Medical History:    Past  Medical History:   Diagnosis Date     Seasonal allergies        Past Surgical History:    Past Surgical History:   Procedure Laterality Date     COMBINED  SECTION, TUBAL OCCLUSION Bilateral 2016    Procedure: COMBINED  SECTION, TUBAL OCCLUSION;  Surgeon: Elder Hwang MD;  Location: [facility]     wisdom teeth removed         Family History:    Family History   Problem Relation Age of Onset     Hypertension Mother      Lipids Father      Hyperlipidemia Father      Diabetes No family hx of      Asthma No family hx of      Thyroid Disease No family hx of      Colon Cancer No family hx of      Breast Cancer No family hx of      MYH-Associated Polyposis Maternal Uncle        Social History:  Marital Status:   [2]  Social History     Tobacco Use     Smoking status: Never Smoker     Smokeless tobacco: Never Used   Substance Use Topics     Alcohol use: No     Alcohol/week: 0.0 standard drinks     Comment: Occasionally      Drug use: No        Medications:    hydrochlorothiazide (HYDRODIURIL) 12.5 MG tablet  losartan (COZAAR) 100 MG tablet  multivitamin, therapeutic with minerals (MULTI-VITAMIN) TABS tablet          Review of Systems   Constitutional: Positive for activity change. Negative for chills and fever.   Respiratory: Negative for shortness of breath.    Gastrointestinal: Negative for nausea and vomiting.   Musculoskeletal: Negative for neck pain and neck stiffness.        Right shoulder pain. Hit shoulder on stairs   Skin: Negative for color change.   Neurological: Negative for numbness.       Physical Exam   BP: 153/86  Pulse: 93  Temp: 98.1  F (36.7  C)  Resp: 14  SpO2: 100 %      Physical Exam  Vitals signs and nursing note reviewed.   Constitutional:       General: She is in acute distress (Mild).      Appearance: She is overweight.   Neck:      Musculoskeletal: Normal range of motion and neck supple. No muscular tenderness.   Cardiovascular:      Rate and Rhythm: Normal rate.    Pulmonary:      Effort: Pulmonary effort is normal.   Musculoskeletal:         General: Swelling (Very mild) and tenderness present.      Right shoulder: She exhibits decreased range of motion (Related to pain does not want to move her shoulder at this time), tenderness (Over AC joint) and swelling (Very mild). She exhibits no effusion, no crepitus, no deformity, normal pulse and normal strength (Strong hand grasp, able to move elbow without difficulty).      Comments: Right radial pulse 3+   Skin:     Capillary Refill: Capillary refill takes less than 2 seconds.      Findings: No bruising or erythema.   Neurological:      Mental Status: She is alert and oriented to person, place, and time.   Psychiatric:         Behavior: Behavior normal.         ED Course        Procedures             No results found for this or any previous visit (from the past 24 hour(s)).    Medications - No data to display    Assessments & Plan (with Medical Decision Making)     I have reviewed the nursing notes.    I have reviewed the findings, diagnosis, plan and need for follow up with the patient.  (S48.918A) Sprain of right shoulder, unspecified shoulder sprain type, initial encounter  Comment: 36 year old female who presents with right shoulder pain that occurred today after she fell backwards onto the steps.  She heard a pop.  Had some numbness and tingling in her fingers that has resolved at this time.  Her elbow is nontender.  Denies previous shoulder injuries.  She is right-hand dominant.  Has not taken any over-the-counter medications or tried any home interventions.  Denies any neck pain.  Denies fevers, chills, nausea, vomiting, and shortness of breath.    MDM: NHT. Lungs CTA  Tenderness over the AC joint with palpation.  No ecchymotic or erythema noted, mild swelling. She has her arm in a sling at this time for comfort.  Hand grasp is strong, no pain over the forearm, elbow, or upper arm with palpation.    Right shoulder x-ray  reviewed and per radiology;  Normal right shoulder.    Plan: Education provided for shoulder sprain.  Keep affected extremity elevated as much as possible for next 24 - 48 hours. Ice to affected area 20 minutes every hour as needed for comfort. After 48 hours you can apply heat.  Tylenol 650 to 1000 mg every four to six hours as needed (not to exceed more than 4000 mg in a 24 hour period). May use interchangeably. Suggest medicating around the clock for the next 24-48 hours. Use arm sling  until you can move your arm without having pain. Slowly start to wiggle your fingers and move hand, wrist, and shoulder as often as possible but not beyond the point of pain. Follow up with primary provider as needed  These discharge instructions and medications were reviewed with her and understanding verbalized.    Discharge Medication List as of 6/6/2021  3:19 PM          Final diagnoses:   Sprain of right shoulder, unspecified shoulder sprain type, initial encounter       6/6/2021   [facility]       Therese Marquez, CNP  06/09/21 0930

## 2021-06-06 NOTE — ED TRIAGE NOTES
Pt is here with right shoulder pain after fall donwn stairs and fell onto right shoulder   Pt denies hitting head

## 2021-06-06 NOTE — DISCHARGE INSTRUCTIONS
Keep affected extremity elevated as much as possible for next 24 - 48 hours. Ice to affected area 20 minutes every hour as needed for comfort. After 48 hours you can apply heat.  Tylenol 650 to 1000 mg every four to six hours as needed (not to exceed more than 4000 mg in a 24 hour period). May use interchangeably. Suggest medicating around the clock for the next 24-48 hours. Use arm sling  until you can move your arm without having pain. Slowly start to wiggle your fingers and move hand, wrist, and shoulder as often as possible but not beyond the point of pain. Follow up with primary provider as needed

## 2021-10-03 ENCOUNTER — HEALTH MAINTENANCE LETTER (OUTPATIENT)
Age: 36
End: 2021-10-03

## 2022-05-14 ENCOUNTER — HEALTH MAINTENANCE LETTER (OUTPATIENT)
Age: 37
End: 2022-05-14

## 2022-09-04 ENCOUNTER — HEALTH MAINTENANCE LETTER (OUTPATIENT)
Age: 37
End: 2022-09-04

## 2023-02-22 ENCOUNTER — APPOINTMENT (OUTPATIENT)
Dept: GENERAL RADIOLOGY | Facility: HOSPITAL | Age: 38
End: 2023-02-22
Attending: STUDENT IN AN ORGANIZED HEALTH CARE EDUCATION/TRAINING PROGRAM
Payer: COMMERCIAL

## 2023-02-22 ENCOUNTER — HOSPITAL ENCOUNTER (EMERGENCY)
Facility: HOSPITAL | Age: 38
Discharge: HOME | End: 2023-02-22
Attending: STUDENT IN AN ORGANIZED HEALTH CARE EDUCATION/TRAINING PROGRAM | Admitting: STUDENT IN AN ORGANIZED HEALTH CARE EDUCATION/TRAINING PROGRAM
Payer: COMMERCIAL

## 2023-02-22 VITALS
BODY MASS INDEX: 28.12 KG/M2 | TEMPERATURE: 98.2 F | WEIGHT: 175 LBS | HEART RATE: 75 BPM | OXYGEN SATURATION: 97 % | SYSTOLIC BLOOD PRESSURE: 131 MMHG | RESPIRATION RATE: 18 BRPM | HEIGHT: 66 IN | DIASTOLIC BLOOD PRESSURE: 90 MMHG

## 2023-02-22 DIAGNOSIS — R07.9 CHEST PAIN, UNSPECIFIED TYPE: ICD-10-CM

## 2023-02-22 LAB
ALBUMIN SERPL BCG-MCNC: 4.5 G/DL (ref 3.5–5.2)
ALP SERPL-CCNC: 67 U/L (ref 35–104)
ALT SERPL W P-5'-P-CCNC: 31 U/L (ref 10–35)
ANION GAP SERPL CALCULATED.3IONS-SCNC: 12 MMOL/L (ref 7–15)
AST SERPL W P-5'-P-CCNC: 25 U/L (ref 10–35)
BASOPHILS # BLD AUTO: 0 10E3/UL (ref 0–0.2)
BASOPHILS NFR BLD AUTO: 0 %
BILIRUB SERPL-MCNC: 0.4 MG/DL
BUN SERPL-MCNC: 10.1 MG/DL (ref 6–20)
CALCIUM SERPL-MCNC: 9.9 MG/DL (ref 8.6–10)
CHLORIDE SERPL-SCNC: 101 MMOL/L (ref 98–107)
CREAT SERPL-MCNC: 0.77 MG/DL (ref 0.51–0.95)
DEPRECATED HCO3 PLAS-SCNC: 26 MMOL/L (ref 22–29)
EOSINOPHIL # BLD AUTO: 0 10E3/UL (ref 0–0.7)
EOSINOPHIL NFR BLD AUTO: 1 %
ERYTHROCYTE [DISTWIDTH] IN BLOOD BY AUTOMATED COUNT: 13 % (ref 10–15)
GFR SERPL CREATININE-BSD FRML MDRD: >90 ML/MIN/1.73M2
GLUCOSE SERPL-MCNC: 104 MG/DL (ref 70–99)
HCT VFR BLD AUTO: 41.6 % (ref 35–47)
HGB BLD-MCNC: 13.6 G/DL (ref 11.7–15.7)
HOLD SPECIMEN: NORMAL
IMM GRANULOCYTES # BLD: 0 10E3/UL
IMM GRANULOCYTES NFR BLD: 0 %
LYMPHOCYTES # BLD AUTO: 1.2 10E3/UL (ref 0.8–5.3)
LYMPHOCYTES NFR BLD AUTO: 21 %
MCH RBC QN AUTO: 28 PG (ref 26.5–33)
MCHC RBC AUTO-ENTMCNC: 32.7 G/DL (ref 31.5–36.5)
MCV RBC AUTO: 86 FL (ref 78–100)
MONOCYTES # BLD AUTO: 0.4 10E3/UL (ref 0–1.3)
MONOCYTES NFR BLD AUTO: 7 %
NEUTROPHILS # BLD AUTO: 3.9 10E3/UL (ref 1.6–8.3)
NEUTROPHILS NFR BLD AUTO: 71 %
NRBC # BLD AUTO: 0 10E3/UL
NRBC BLD AUTO-RTO: 0 /100
PLATELET # BLD AUTO: 232 10E3/UL (ref 150–450)
POTASSIUM SERPL-SCNC: 3.5 MMOL/L (ref 3.4–5.3)
PROT SERPL-MCNC: 7.6 G/DL (ref 6.4–8.3)
RBC # BLD AUTO: 4.85 10E6/UL (ref 3.8–5.2)
SODIUM SERPL-SCNC: 139 MMOL/L (ref 136–145)
TROPONIN T SERPL HS-MCNC: <6 NG/L
WBC # BLD AUTO: 5.6 10E3/UL (ref 4–11)

## 2023-02-22 PROCEDURE — 250N000009 HC RX 250: Performed by: STUDENT IN AN ORGANIZED HEALTH CARE EDUCATION/TRAINING PROGRAM

## 2023-02-22 PROCEDURE — 93005 ELECTROCARDIOGRAM TRACING: CPT

## 2023-02-22 PROCEDURE — 93010 ELECTROCARDIOGRAM REPORT: CPT | Performed by: INTERNAL MEDICINE

## 2023-02-22 PROCEDURE — 36415 COLL VENOUS BLD VENIPUNCTURE: CPT | Performed by: STUDENT IN AN ORGANIZED HEALTH CARE EDUCATION/TRAINING PROGRAM

## 2023-02-22 PROCEDURE — 99284 EMERGENCY DEPT VISIT MOD MDM: CPT | Performed by: STUDENT IN AN ORGANIZED HEALTH CARE EDUCATION/TRAINING PROGRAM

## 2023-02-22 PROCEDURE — 71046 X-RAY EXAM CHEST 2 VIEWS: CPT

## 2023-02-22 PROCEDURE — 85025 COMPLETE CBC W/AUTO DIFF WBC: CPT | Performed by: STUDENT IN AN ORGANIZED HEALTH CARE EDUCATION/TRAINING PROGRAM

## 2023-02-22 PROCEDURE — 80053 COMPREHEN METABOLIC PANEL: CPT | Performed by: STUDENT IN AN ORGANIZED HEALTH CARE EDUCATION/TRAINING PROGRAM

## 2023-02-22 PROCEDURE — 250N000013 HC RX MED GY IP 250 OP 250 PS 637: Performed by: STUDENT IN AN ORGANIZED HEALTH CARE EDUCATION/TRAINING PROGRAM

## 2023-02-22 PROCEDURE — 99285 EMERGENCY DEPT VISIT HI MDM: CPT | Mod: 25

## 2023-02-22 PROCEDURE — 84484 ASSAY OF TROPONIN QUANT: CPT | Performed by: STUDENT IN AN ORGANIZED HEALTH CARE EDUCATION/TRAINING PROGRAM

## 2023-02-22 RX ADMIN — LIDOCAINE HYDROCHLORIDE 30 ML: 20 SOLUTION ORAL; TOPICAL at 09:33

## 2023-02-22 ASSESSMENT — ACTIVITIES OF DAILY LIVING (ADL): ADLS_ACUITY_SCORE: 35

## 2023-02-22 NOTE — ED PROVIDER NOTES
History     Chief Complaint   Patient presents with     Chest Pain     HPI  Bing Osullivan is a 37 year old female with no relevant personal history but strong family history of cardiac disease presents to the emergency department today with chest pain that started this morning.  She has been experiencing intermittent chest pain over the past week.  She had associated numbness and tingling down her left arm and leg yesterday which improved.  No ripping tearing chest pain going into the back.  No unilateral swelling recent hospitalizations, surgeries, coughing up blood or history of blood clots.  No other complaints at this time  Denies cough and fevers.  Allergies:  Allergies   Allergen Reactions     Seasonal Allergies Other (See Comments)     Sinus       Dust Mites Other (See Comments)     Red eyes and sneezing       Problem List:    Patient Active Problem List    Diagnosis Date Noted     Preeclampsia complicating hypertension 2016     Priority: Medium     Post partum          delivery delivered 2016     Priority: Medium     Twins.  Breech  1 LTCS with BTO  Jaiden 15       Encounter for sterilization 2016     Priority: Medium     Desires BTO if CS.  Counseled.  Preferred one insurance.  No gov't pay.         HTN (hypertension) 01/15/2015     Priority: Medium     Palpitations 2014     Priority: Medium     Work up benign 4 years ago with echo/ekg by report.  Was placed on B-Blocker.  Will fu with Dr. Rodrigues.          Past Medical History:    Past Medical History:   Diagnosis Date     Seasonal allergies        Past Surgical History:    Past Surgical History:   Procedure Laterality Date     COMBINED  SECTION, TUBAL OCCLUSION Bilateral 2016    Procedure: COMBINED  SECTION, TUBAL OCCLUSION;  Surgeon: Elder Hwang MD;  Location: [facility]     wisdom teeth removed         Family History:    Family History   Problem Relation Age of Onset     Hypertension Mother       "Lipids Father      Hyperlipidemia Father      Diabetes No family hx of      Asthma No family hx of      Thyroid Disease No family hx of      Colon Cancer No family hx of      Breast Cancer No family hx of      MYH-Associated Polyposis Maternal Uncle        Social History:  Marital Status:   [2]  Social History     Tobacco Use     Smoking status: Never     Smokeless tobacco: Never   Substance Use Topics     Alcohol use: No     Alcohol/week: 0.0 standard drinks     Comment: Occasionally      Drug use: No        Medications:    hydrochlorothiazide (HYDRODIURIL) 12.5 MG tablet  losartan (COZAAR) 100 MG tablet  multivitamin w/minerals (THERA-VIT-M) tablet          Review of Systems  A complete review of systems was performed and is otherwise negative.     Physical Exam   BP: 130/95  Pulse: 106  Temp: 98.2  F (36.8  C)  Resp: 16  Height: 167.6 cm (5' 6\")  Weight: 79.4 kg (175 lb)  SpO2: 98 %      Physical Exam  Constitutional: Alert and conversant. NAD   HENT: NCAT   Eyes: Normal pupils   Neck: supple   CV: Normal rate on my exam, regular rhythm, no murmur   Pulmonary/Chest: Non-labored respirations, clear to auscultation bilaterally   Abdominal: Soft, non-tender, non-distended   MSK: SAINZ.   Neuro: Alert and appropriate   Skin: Warm and dry. No diaphoresis. No rashes on exposed skin    Psych: Appropriate mood and affect     ED Course              ED Course as of 02/22/23 0947   Wed Feb 22, 2023   0938 Bing Osullivan is a 37 year old female presenting with chest pain.    Differential includes but is not limited to acute coronary syndrome, pulmonary embolism, pneumonia, aortic dissection, pneumothorax, GERD, pericarditis, myocarditis, MSK/costochondritis, shingles.    Vitals wnl   Exam reassuring, welll apppearing and non toxic   EKG with non specific isolated TWI in III with no contiguous changes and no findings of acute ischemia, NSR, Qtc 510   CXR without concerning changes   Labs normal   HEART Score 1 "     History and exam suggest a low likelihood of pulmonary embolism, aortic dissection, or pulmonary pathology as the etiology of this patient's pain.      Given the patient's few risk factors and atypical history for acute coronary syndrome with studies results suggesting low suspicion of a coronary event, the patient is stable for outpatient management. Given the nature of the patient's symptoms a stress echo with cardiology follow up is necessary and has been scheduled.        Procedures           Results for orders placed or performed during the hospital encounter of 02/22/23 (from the past 24 hour(s))   CBC with platelets differential    Narrative    The following orders were created for panel order CBC with platelets differential.  Procedure                               Abnormality         Status                     ---------                               -----------         ------                     CBC with platelets and d...[[id number]]                      Final result                 Please view results for these tests on the individual orders.   Comprehensive metabolic panel   Result Value Ref Range    Sodium 139 136 - 145 mmol/L    Potassium 3.5 3.4 - 5.3 mmol/L    Chloride 101 98 - 107 mmol/L    Carbon Dioxide (CO2) 26 22 - 29 mmol/L    Anion Gap 12 7 - 15 mmol/L    Urea Nitrogen 10.1 6.0 - 20.0 mg/dL    Creatinine 0.77 0.51 - 0.95 mg/dL    Calcium 9.9 8.6 - 10.0 mg/dL    Glucose 104 (H) 70 - 99 mg/dL    Alkaline Phosphatase 67 35 - 104 U/L    AST 25 10 - 35 U/L    ALT 31 10 - 35 U/L    Protein Total 7.6 6.4 - 8.3 g/dL    Albumin 4.5 3.5 - 5.2 g/dL    Bilirubin Total 0.4 <=1.2 mg/dL    GFR Estimate >90 >60 mL/min/1.73m2   Troponin T, High Sensitivity   Result Value Ref Range    Troponin T, High Sensitivity <6 <=14 ng/L   [place] Draw    Narrative    The following orders were created for panel order [place] Draw.  Procedure                               Abnormality         Status                      ---------                               -----------         ------                     Extra Blue Top Tube[[id number]]                              In process                 Extra Red Top Tube[[id number]]                               In process                 Extra Green Top (Lithium...[[id number]]                                                 Extra Green Top (Lithium...[[id number]]                      In process                 Extra Purple Top Tube[[id number]]                                                         Please view results for these tests on the individual orders.   CBC with platelets and differential   Result Value Ref Range    WBC Count 5.6 4.0 - 11.0 10e3/uL    RBC Count 4.85 3.80 - 5.20 10e6/uL    Hemoglobin 13.6 11.7 - 15.7 g/dL    Hematocrit 41.6 35.0 - 47.0 %    MCV 86 78 - 100 fL    MCH 28.0 26.5 - 33.0 pg    MCHC 32.7 31.5 - 36.5 g/dL    RDW 13.0 10.0 - 15.0 %    Platelet Count 232 150 - 450 10e3/uL    % Neutrophils 71 %    % Lymphocytes 21 %    % Monocytes 7 %    % Eosinophils 1 %    % Basophils 0 %    % Immature Granulocytes 0 %    NRBCs per 100 WBC 0 <1 /100    Absolute Neutrophils 3.9 1.6 - 8.3 10e3/uL    Absolute Lymphocytes 1.2 0.8 - 5.3 10e3/uL    Absolute Monocytes 0.4 0.0 - 1.3 10e3/uL    Absolute Eosinophils 0.0 0.0 - 0.7 10e3/uL    Absolute Basophils 0.0 0.0 - 0.2 10e3/uL    Absolute Immature Granulocytes 0.0 <=0.4 10e3/uL    Absolute NRBCs 0.0 10e3/uL   XR Chest 2 Views    Narrative    PROCEDURE:  XR CHEST 2 VIEWS    HISTORY: CP    COMPARISON:  Radiographs 10/31/2017, 7/31/2015    FINDINGS: PA and lateral chest radiographs  Cardiomediastinal silhouette is within normal limits.  No focal consolidation, effusion or pneumothorax.    No suspicious osseous lesion or subdiaphragmatic free air.      Impression    IMPRESSION:    No acute cardiopulmonary process.      RABIA DIA MD         SYSTEM ID:  [id number]       Medications   lidocaine (viscous) (XYLOCAINE) 2 % 15 mL, alum & mag  hydroxide-simethicone (MAALOX) 15 mL GI Cocktail (30 mLs Oral $Given 2/22/23 7302)       Assessments & Plan (with Medical Decision Making)     I have reviewed the nursing notes.    I have reviewed the findings, diagnosis, plan and need for follow up with the patient.      New Prescriptions    No medications on file       Final diagnoses:   Chest pain, unspecified type       2/22/2023   [facility]     Gabriel Martell MD  02/22/23 3415

## 2023-02-22 NOTE — ED TRIAGE NOTES
Pt presents with c/o chest pain on and off since Thursday, Pt states it has gotten more intense with numbness and tingling down her left arm and left leg feels heavy. Pt has cardiac hx on both sides of her family.

## 2023-02-22 NOTE — DISCHARGE INSTRUCTIONS
Return to the emergency department for worsening symptoms or new concerning symptoms.  Follow-up with your primary care provider within the next 1 to 2 weeks.  Call to schedule appointment.  Follow-up with stress test and discuss with your primary stress testing.

## 2023-03-16 ENCOUNTER — HOSPITAL ENCOUNTER (OUTPATIENT)
Dept: NUCLEAR MEDICINE | Facility: HOSPITAL | Age: 38
Setting detail: NUCLEAR MEDICINE
Discharge: HOME | End: 2023-03-16
Attending: STUDENT IN AN ORGANIZED HEALTH CARE EDUCATION/TRAINING PROGRAM
Payer: COMMERCIAL

## 2023-03-16 ENCOUNTER — HOSPITAL ENCOUNTER (OUTPATIENT)
Dept: CARDIOLOGY | Facility: HOSPITAL | Age: 38
Setting detail: NUCLEAR MEDICINE
Discharge: HOME | End: 2023-03-16
Attending: STUDENT IN AN ORGANIZED HEALTH CARE EDUCATION/TRAINING PROGRAM
Payer: COMMERCIAL

## 2023-03-16 DIAGNOSIS — R07.9 CHEST PAIN, UNSPECIFIED TYPE: ICD-10-CM

## 2023-03-16 LAB
CV BLOOD PRESSURE: 86 MMHG
CV STRESS MAX HR HE: 180
NUC STRESS EJECTION FRACTION: 83 %
RATE PRESSURE PRODUCT: NORMAL
STRESS ECHO BASELINE DIASTOLIC HE: 92
STRESS ECHO BASELINE HR: 94 BPM
STRESS ECHO BASELINE SYSTOLIC BP: 128
STRESS ECHO CALCULATED PERCENT HR: 99 %
STRESS ECHO LAST STRESS DIASTOLIC BP: 90
STRESS ECHO LAST STRESS SYSTOLIC BP: 152
STRESS ECHO POST ESTIMATED WORKLOAD: 9.2 METS
STRESS ECHO POST EXERCISE DUR MIN: 7 MIN
STRESS ECHO POST EXERCISE DUR SEC: 15 SEC
STRESS ECHO TARGET HR: 181

## 2023-03-16 PROCEDURE — 78452 HT MUSCLE IMAGE SPECT MULT: CPT

## 2023-03-16 PROCEDURE — A9500 TC99M SESTAMIBI: HCPCS | Performed by: RADIOLOGY

## 2023-03-16 PROCEDURE — 93017 CV STRESS TEST TRACING ONLY: CPT

## 2023-03-16 PROCEDURE — 93018 CV STRESS TEST I&R ONLY: CPT | Performed by: INTERNAL MEDICINE

## 2023-03-16 PROCEDURE — 343N000001 HC RX 343: Performed by: RADIOLOGY

## 2023-03-16 PROCEDURE — 93016 CV STRESS TEST SUPVJ ONLY: CPT | Performed by: INTERNAL MEDICINE

## 2023-03-16 RX ADMIN — Medication 9.8 MILLICURIE: at 07:34

## 2023-03-16 RX ADMIN — Medication 30.5 MILLICURIE: at 09:30

## 2023-06-03 ENCOUNTER — HEALTH MAINTENANCE LETTER (OUTPATIENT)
Age: 38
End: 2023-06-03

## 2024-04-12 ENCOUNTER — HOSPITAL ENCOUNTER (EMERGENCY)
Facility: HOSPITAL | Age: 39
Discharge: HOME | End: 2024-04-12
Payer: COMMERCIAL

## 2024-04-12 VITALS
HEART RATE: 80 BPM | DIASTOLIC BLOOD PRESSURE: 107 MMHG | SYSTOLIC BLOOD PRESSURE: 146 MMHG | TEMPERATURE: 98 F | OXYGEN SATURATION: 99 % | RESPIRATION RATE: 18 BRPM

## 2024-04-12 DIAGNOSIS — J02.0 STREP PHARYNGITIS: ICD-10-CM

## 2024-04-12 LAB — GROUP A STREP BY PCR: DETECTED

## 2024-04-12 PROCEDURE — 87651 STREP A DNA AMP PROBE: CPT

## 2024-04-12 PROCEDURE — 99213 OFFICE O/P EST LOW 20 MIN: CPT

## 2024-04-12 PROCEDURE — G0463 HOSPITAL OUTPT CLINIC VISIT: HCPCS

## 2024-04-12 RX ORDER — AMOXICILLIN 500 MG/1
500 CAPSULE ORAL 2 TIMES DAILY
Qty: 20 CAPSULE | Refills: 0 | Status: SHIPPED | OUTPATIENT
Start: 2024-04-12 | End: 2024-04-22

## 2024-04-12 RX ORDER — FLUCONAZOLE 150 MG/1
150 TABLET ORAL ONCE
Qty: 1 TABLET | Refills: 0 | Status: SHIPPED | OUTPATIENT
Start: 2024-04-12 | End: 2024-04-12

## 2024-04-12 ASSESSMENT — ENCOUNTER SYMPTOMS
NAUSEA: 0
COUGH: 0
CHILLS: 1
VOMITING: 0
APPETITE CHANGE: 0
TROUBLE SWALLOWING: 0
SHORTNESS OF BREATH: 0
ABDOMINAL PAIN: 0
SORE THROAT: 1
DIARRHEA: 0
FEVER: 1
ACTIVITY CHANGE: 0

## 2024-04-12 ASSESSMENT — ACTIVITIES OF DAILY LIVING (ADL): ADLS_ACUITY_SCORE: 35

## 2024-04-12 NOTE — DISCHARGE INSTRUCTIONS
Amoxicillin 2 times daily for 10 days. Yogurt or probiotic while taking.   Diflucan as needed if you develop vaginal yeast.   Cool liquids, honey, and salt water gargles to sooth throat.   Return with any failed improvement in 48 hours, trouble opening mouth, difficulty swallowing, or other concerns.   Follow up in the clinic as needed.

## 2024-04-12 NOTE — ED PROVIDER NOTES
History     Chief Complaint   Patient presents with    Pharyngitis     HPI  Bing Osullivan is a 39 year old female who presents to the urgent care with complaints of a 5 day history of a sore throat, foul taste in mouth, fevers, and chills. She denies swallowing difficulty, difficulty opening mouth, abd pain, n/v/d, and decreased oral intake. Ibuprofen around 1200 today. No recent abx. Denies ill contacts.      Allergies:  Allergies   Allergen Reactions    Seasonal Allergies Other (See Comments)     Sinus      Dust Mites Other (See Comments)     Red eyes and sneezing       Problem List:    Patient Active Problem List    Diagnosis Date Noted    Preeclampsia complicating hypertension 2016     Priority: Medium     Post partum         delivery delivered 2016     Priority: Medium     Twins.  Breech  1 LTCS with BTO  Jaiden 15      Encounter for sterilization 2016     Priority: Medium     Desires BTO if CS.  Counseled.  Preferred one insurance.  No gov't pay.        HTN (hypertension) 01/15/2015     Priority: Medium    Palpitations 2014     Priority: Medium     Work up benign 4 years ago with echo/ekg by report.  Was placed on B-Blocker.  Will fu with Dr. Rodrigues.          Past Medical History:    Past Medical History:   Diagnosis Date    Seasonal allergies        Past Surgical History:    Past Surgical History:   Procedure Laterality Date    COMBINED  SECTION, TUBAL OCCLUSION Bilateral 2016    Procedure: COMBINED  SECTION, TUBAL OCCLUSION;  Surgeon: Elder Hwang MD;  Location: [facility]    wisdom teeth removed         Family History:    Family History   Problem Relation Age of Onset    Hypertension Mother     Lipids Father     Hyperlipidemia Father     Diabetes No family hx of     Asthma No family hx of     Thyroid Disease No family hx of     Colon Cancer No family hx of     Breast Cancer No family hx of     MYH-Associated Polyposis Maternal Uncle        Social  History:  Marital Status:   [2]  Social History     Tobacco Use    Smoking status: Never    Smokeless tobacco: Never   Substance Use Topics    Alcohol use: No     Alcohol/week: 0.0 standard drinks of alcohol     Comment: Occasionally     Drug use: No        Medications:    amoxicillin (AMOXIL) 500 MG capsule  fluconazole (DIFLUCAN) 150 MG tablet  hydrochlorothiazide (HYDRODIURIL) 12.5 MG tablet  losartan (COZAAR) 100 MG tablet  multivitamin w/minerals (THERA-VIT-M) tablet          Review of Systems   Constitutional:  Positive for chills and fever. Negative for activity change and appetite change.   HENT:  Positive for sore throat. Negative for congestion and trouble swallowing.    Respiratory:  Negative for cough and shortness of breath.    Gastrointestinal:  Negative for abdominal pain, diarrhea, nausea and vomiting.   All other systems reviewed and are negative.      Physical Exam   BP: (!) 146/107  Pulse: 80  Temp: 98  F (36.7  C)  Resp: 18  SpO2: 99 %      Physical Exam  Vitals and nursing note reviewed.   Constitutional:       General: She is not in acute distress.     Appearance: She is well-developed. She is not ill-appearing, toxic-appearing or diaphoretic.   HENT:      Head:      Jaw: No trismus.      Right Ear: Tympanic membrane is not erythematous.      Left Ear: Tympanic membrane is not erythematous.      Mouth/Throat:      Mouth: Mucous membranes are moist.      Pharynx: Oropharynx is clear. Uvula midline. Posterior oropharyngeal erythema present. No uvula swelling.      Tonsils: Tonsillar exudate present. No tonsillar abscesses. 1+ on the right. 1+ on the left.   Cardiovascular:      Rate and Rhythm: Normal rate and regular rhythm.   Lymphadenopathy:      Cervical: Cervical adenopathy present.   Skin:     General: Skin is warm and dry.   Neurological:      Mental Status: She is alert.         ED Course        Procedures        Results for orders placed or performed during the hospital encounter  of 04/12/24 (from the past 24 hour(s))   Group A Streptococcus PCR Throat Swab    Specimen: Throat; Swab   Result Value Ref Range    Group A strep by PCR Detected (A) Not Detected    Narrative    The Xpert Xpress Strep A test, performed on the [employer], is a rapid, qualitative in vitro diagnostic test for the detection of Streptococcus pyogenes (Group A ß-hemolytic Streptococcus, Strep A) in throat swab specimens from patients with signs and symptoms of pharyngitis. The Xpert Xpress Strep A test can be used as an aid in the diagnosis of Group A Streptococcal pharyngitis. The assay is not intended to monitor treatment for Group A Streptococcus infections. The Xpert Xpress Strep A test utilizes an automated real-time polymerase chain reaction (PCR) to detect Streptococcus pyogenes DNA.       Medications - No data to display    Assessments & Plan (with Medical Decision Making)     I have reviewed the nursing notes.    I have reviewed the findings, diagnosis, plan and need for follow up with the patient.  Bing Osullivan is a 39 year old female who presents to the urgent care with complaints of a 5 day history of a sore throat, foul taste in mouth, fevers, and chills. She denies swallowing difficulty, difficulty opening mouth, abd pain, n/v/d, and decreased oral intake. Ibuprofen around 1200 today. No recent abx. Denies ill contacts.    MDM: vital signs normal, afebrile. Non toxic in appearance with no noted distress. Lungs clear, heart tones regular. Tonsils 1+ and equal with erythema and exudate. Uvula midline. Petechiae to posterior oropharynx. No trismus, drooling, or visible peritonsillar abscess. Presentation consistent with strep. Amoxicillin prescribed despite pending strep. Supportive measures and return precautions discussed. She is in agreement with plan.     (J02.0) Strep pharyngitis  Plan: Amoxicillin 2 times daily for 10 days. Yogurt or probiotic while taking.   Diflucan as needed if  you develop vaginal yeast.   Cool liquids, honey, and salt water gargles to sooth throat.   Return with any failed improvement in 48 hours, trouble opening mouth, difficulty swallowing, or other concerns.   Follow up in the clinic as needed. Understanding verbalized.       Discharge Medication List as of 4/12/2024  6:21 PM        START taking these medications    Details   amoxicillin (AMOXIL) 500 MG capsule Take 1 capsule (500 mg) by mouth 2 times daily for 10 days, Disp-20 capsule, R-0, E-Prescribe      fluconazole (DIFLUCAN) 150 MG tablet Take 1 tablet (150 mg) by mouth once for 1 dose, Disp-1 tablet, R-0, E-Prescribe             Final diagnoses:   Strep pharyngitis       4/12/2024   [facility]       Vanessa Beckford NP  04/12/24 7254       Vanessa Beckford NP  04/12/24 4946

## 2024-04-12 NOTE — ED TRIAGE NOTES
Pt presents with c/o having a sore throat   Pt reports that it started on Monday and has progressively gotten worse   Reports intermittent fevers and chills  Pt had ibu last at 12

## 2024-04-28 ENCOUNTER — HEALTH MAINTENANCE LETTER (OUTPATIENT)
Age: 39
End: 2024-04-28

## 2024-07-07 ENCOUNTER — HEALTH MAINTENANCE LETTER (OUTPATIENT)
Age: 39
End: 2024-07-07

## 2025-07-13 ENCOUNTER — HEALTH MAINTENANCE LETTER (OUTPATIENT)
Age: 40
End: 2025-07-13